# Patient Record
Sex: MALE | Race: WHITE
[De-identification: names, ages, dates, MRNs, and addresses within clinical notes are randomized per-mention and may not be internally consistent; named-entity substitution may affect disease eponyms.]

---

## 2020-10-08 ENCOUNTER — HOSPITAL ENCOUNTER (OUTPATIENT)
Dept: HOSPITAL 53 - M SMT | Age: 75
End: 2020-10-08
Attending: NURSE PRACTITIONER
Payer: COMMERCIAL

## 2020-10-08 DIAGNOSIS — N21.0: Primary | ICD-10-CM

## 2020-10-08 LAB
APPEARANCE UR: CLEAR
BACTERIA UR QL AUTO: NEGATIVE
BILIRUB UR QL STRIP.AUTO: NEGATIVE
GLUCOSE UR QL STRIP.AUTO: (no result) MG/DL
HGB UR QL STRIP.AUTO: NEGATIVE
KETONES UR QL STRIP.AUTO: NEGATIVE MG/DL
LEUKOCYTE ESTERASE UR QL STRIP.AUTO: NEGATIVE
MUCOUS THREADS URNS QL MICRO: (no result)
NITRITE UR QL STRIP.AUTO: NEGATIVE
PH UR STRIP.AUTO: 5 UNITS (ref 5–9)
PROT UR QL STRIP.AUTO: (no result) MG/DL
RBC # UR AUTO: 1 /HPF (ref 0–3)
SP GR UR STRIP.AUTO: 1.02 (ref 1–1.03)
SQUAMOUS #/AREA URNS AUTO: 0 /HPF (ref 0–6)
UROBILINOGEN UR QL STRIP.AUTO: 0.2 MG/DL (ref 0–2)
WBC #/AREA URNS AUTO: 3 /HPF (ref 0–3)

## 2020-11-10 ENCOUNTER — HOSPITAL ENCOUNTER (OUTPATIENT)
Dept: HOSPITAL 53 - M RAD | Age: 75
End: 2020-11-10
Attending: UROLOGY
Payer: COMMERCIAL

## 2020-11-10 DIAGNOSIS — N50.3: ICD-10-CM

## 2020-11-10 DIAGNOSIS — N50.89: Primary | ICD-10-CM

## 2020-11-10 DIAGNOSIS — N43.3: ICD-10-CM

## 2020-11-10 NOTE — REP
INDICATION:

SCROTAL MASSES.



COMPARISON:

None.



TECHNIQUE:

Standard sonography and duplex Doppler with color flow of the testes also performed.



FINDINGS:

Sonography shows the right testis 4.9 x 2.7 x 3.8 cm and homogeneous in echotexture.

No solid mass in the right testis.  There are few tiny microcalcifications evident.

Color flow shows no abnormal hyperemia.  There is a small right testicular appendage

evident 3 x 3 mm.  The right epididymis is difficult to see there is a large cystic

area lateral to the testicle and larger the testicle and may be compressing the

epididymis and representing septated hydrocele.  Trace amount of fluid directly around

the testis otherwise.  Appears to deform tissue that is likely representing the

epididymal head.



The left testis is 5 x 2.3 x 3.1 cm.It has a few scattered punctate

microcalcifications. Color images show no hyperemia. No intra testicular solid mass or

cyst.  There is a small left hydrocele.  The epididymal head on the left measures 8 mm

in CC diameter.  There is a small left hydrocele. There is a 4.6 x 3.7 mm epididymal

head cyst on the left.



Doppler tracing shows resistive index 0.72 on the right and 0.49 on the left.



IMPRESSION:

1. The left testis without focal mass but with microcalcifications scattered within.

There is a 5 x 4 mm epididymal head cyst and small hydrocele.  No significant finding.

2. Right testis with tiny microcalcification and no mass or cyst within.  Larger fluid

collection likely septated hydrocele lateral to the testis and trace fluid elsewhere

around the testis.  Lesion may be compressing the epididymal head which is not well

visualized.  An epididymal appendix is seen.

3. Normal bilateral Doppler tracings of the testes with no hyperemia.







<Electronically signed by Erasmo Reich > 11/10/20 [id number]